# Patient Record
Sex: MALE
[De-identification: names, ages, dates, MRNs, and addresses within clinical notes are randomized per-mention and may not be internally consistent; named-entity substitution may affect disease eponyms.]

---

## 2020-02-25 ENCOUNTER — HOSPITAL ENCOUNTER (OUTPATIENT)
Dept: HOSPITAL 92 - CT | Age: 60
Discharge: HOME | End: 2020-02-25
Attending: FAMILY MEDICINE
Payer: COMMERCIAL

## 2020-02-25 DIAGNOSIS — R91.8: ICD-10-CM

## 2020-02-25 DIAGNOSIS — F17.210: ICD-10-CM

## 2020-02-25 DIAGNOSIS — Z12.2: Primary | ICD-10-CM

## 2020-02-25 PROCEDURE — G0297 LDCT FOR LUNG CA SCREEN: HCPCS

## 2020-02-25 NOTE — CT
CT CHEST WITHOUT IV CONTRAST:

Axial tomograms were obtained with multiplanar reconstruction.  A low-dose screening protocol was fol
lowed.

 

INDICATION: 

Nicotine dependence.  

 

COMPARISON: 

There are no prior chest films and no prior chest CTs available for comparison.

 

FINDINGS: 

There is an irregularly shaped mass in the left upper lobe which measures 3.0 cm AP dimension in the 
axial plane.  There is surrounding ground-glass opacity and air bronchograms within this mass.  

 

There is a second mass in the left infrahilar region measuring 2.7 cm AP dimension in the axial plane
.  

 

There is a large pleural-based mass in the left lower lobe posterolaterally measuring 6.2 cm AP dimen
silvia in the axial plane.  

 

Mild left pleural thickening.  No effusion.  

 

The right lung appears clear.  

 

There is mediastinal adenopathy.  AP window nodes are seen.  A paratracheal node measures up to 2 cm.
  There are abnormal carinal lymph nodes.  

 

Images through the upper abdomen are unremarkable.

 

Osseous structures are unremarkable.

 

IMPRESSION: 

1.  There are 3 mass lesions seen in the left lung.  The left upper lobe mass has air bronchograms an
d surrounding haziness which could represent secondary infectious/pneumonia associated with an underl
tello mass lesion.

 

2.  There is a circumscribed infrahilar mass on the left and there is a large pleural-based mass in t
he left lower lobe as described above.

 

Findings are category 4B, suspicious for malignancy.  CT chest with contrast is recommended for furth
er characterization.  Consider PET CT for further evaluation of apparent malignancy.  

 

 

CODE T

 

POS: GABBY

## 2020-03-17 ENCOUNTER — HOSPITAL ENCOUNTER (OUTPATIENT)
Dept: HOSPITAL 92 - PET | Age: 60
Discharge: HOME | End: 2020-03-17
Attending: FAMILY MEDICINE
Payer: COMMERCIAL

## 2020-03-17 DIAGNOSIS — R91.8: Primary | ICD-10-CM

## 2020-03-17 PROCEDURE — A9552 F18 FDG: HCPCS

## 2020-03-17 PROCEDURE — 78815 PET IMAGE W/CT SKULL-THIGH: CPT

## 2020-03-17 NOTE — PET
PET CT:

 

HISTORY: 

A 59-year-old male with lung mass on low-dose CT scan of the chest of 2/25/2020.

 

TECHNIQUE: 

PET scanning with CT attenuation correction was performed from the base of the brain through the prox
imal thighs following the intravenous administration of 13 mCi [id number] in the left ant
ecubital fossa.

 

COMPARISON: 

None.

 

CORRELATION:

LDCT of chest dated 2/25/2020.

 

FINDINGS: 

Multiple hypermetabolic lymph nodes are seen including the right supraclavicular (SUV 5.5), right pre
vascular (SUV 4.6), left prevascular (SUV 6.2), right paratracheal (SUV 7), aortopulmonic (SUV 6), navarro
bcarinal (SUV 9), left hilar (SUV 8), and left infrahilar (SUV 8) lymph nodes.

 

There are 3 hypermetabolic masses with SUVs of 4.3 in the upper lobe, 8.6 in the 2.7 cm mass in the l
eft lower lobe, and 10.5 in the 6.2 cm peripheral mass in the left lower lobe.

 

A hypermetabolic focus in the left lobe of the liver has an SUV of 5.8.

 

No hypermetabolic right lung nodules, right liver lobe, adrenal, or skeletal lesions are seen.

 

There is physiologic activity in the GI/ tracts, heart, and visualized portions of the brain.

 

The CT scan used for attenuation correction demonstrates no evidence of pleural effusions or ascites.


 

IMPRESSION: 

Findings are consistent with left lung malignancy and metastatic disease.

 

POS: GABBY

## 2020-03-26 ENCOUNTER — HOSPITAL ENCOUNTER (OUTPATIENT)
Dept: HOSPITAL 92 - CT | Age: 60
End: 2020-03-26
Attending: INTERNAL MEDICINE
Payer: COMMERCIAL

## 2020-03-26 VITALS — TEMPERATURE: 99 F | DIASTOLIC BLOOD PRESSURE: 75 MMHG | SYSTOLIC BLOOD PRESSURE: 126 MMHG

## 2020-03-26 VITALS — BODY MASS INDEX: 32.2 KG/M2

## 2020-03-26 DIAGNOSIS — Z87.891: ICD-10-CM

## 2020-03-26 DIAGNOSIS — Z79.82: ICD-10-CM

## 2020-03-26 DIAGNOSIS — Z79.899: ICD-10-CM

## 2020-03-26 DIAGNOSIS — I10: ICD-10-CM

## 2020-03-26 DIAGNOSIS — C34.32: Primary | ICD-10-CM

## 2020-03-26 LAB
APTT PPP: 29.6 SEC (ref 22.9–36.1)
INR PPP: 1
PROTHROMBIN TIME: 12.8 SEC (ref 12–14.7)

## 2020-03-26 PROCEDURE — 88341 IMHCHEM/IMCYTCHM EA ADD ANTB: CPT

## 2020-03-26 PROCEDURE — BB28ZZZ COMPUTERIZED TOMOGRAPHY (CT SCAN) OF LEFT TRACHEOBRONCHIAL TREE: ICD-10-PCS | Performed by: RADIOLOGY

## 2020-03-26 PROCEDURE — 85730 THROMBOPLASTIN TIME PARTIAL: CPT

## 2020-03-26 PROCEDURE — 71045 X-RAY EXAM CHEST 1 VIEW: CPT

## 2020-03-26 PROCEDURE — 77002 NEEDLE LOCALIZATION BY XRAY: CPT

## 2020-03-26 PROCEDURE — 36415 COLL VENOUS BLD VENIPUNCTURE: CPT

## 2020-03-26 PROCEDURE — 88333 PATH CONSLTJ SURG CYTO XM 1: CPT

## 2020-03-26 PROCEDURE — 88305 TISSUE EXAM BY PATHOLOGIST: CPT

## 2020-03-26 PROCEDURE — 32405: CPT

## 2020-03-26 PROCEDURE — 88334 PATH CONSLTJ SURG CYTO XM EA: CPT

## 2020-03-26 PROCEDURE — 0BBJ3ZX EXCISION OF LEFT LOWER LUNG LOBE, PERCUTANEOUS APPROACH, DIAGNOSTIC: ICD-10-PCS | Performed by: RADIOLOGY

## 2020-03-26 PROCEDURE — 85610 PROTHROMBIN TIME: CPT

## 2020-03-26 PROCEDURE — 88342 IMHCHEM/IMCYTCHM 1ST ANTB: CPT

## 2020-03-26 NOTE — RAD
EXAM:

XR Chest Insp/Exp



PROVIDED CLINICAL HISTORY:

Post biopsy left lower lobe mass.



COMPARISON:

Chest x-ray on 5/31/2017 and CT thorax on 2/25/2020



FINDINGS:

Left upper lobe parenchymal density which has masslike appearance as well as left lower lobe and left
 hilar masses are seen and better seen on CT thorax. The right lung is clear. Blunting of the left

lateral costophrenic angle is present, but this was seen on the  view of the chest and likely re
lated to pleural thickening as a left lower lobe mass abuts the posterolateral left costophrenic

angle. No pneumothorax or definite pleural effusion is present. There is prominence of the right para
mediastinal structures, but this was present on prior  view of the CT thorax and shown to

represent vascular structures and mildly enlarged lymph nodes. Cardiac silhouette and pulmonary vascu
lature are within normal limits. Vascular calcifications are seen in the aortic arch. Osseous

structures have a normal appearance.



IMPRESSION:



1. Masses left lung better seen on prior CT thorax.

2. No evidence of a pneumothorax.



Reported By: Sagar Fuentes 

Electronically Signed:  3/26/2020 11:10 AM

## 2020-03-26 NOTE — RAD
Chest 2 views inspiratory expiratory



HISTORY: Lung mass with biopsy.



FINDINGS: Correlated with earlier images on the same date.



Left lung is well-inflated. Masses are similar in appearance to the prior study.



Mediastinum is midline. Right lung clear.



  



IMPRESSION :

No evidence of postbiopsy pneumothorax.



Reported By: BHARATI Neumann 

Electronically Signed:  3/26/2020 1:00 PM

## 2020-03-26 NOTE — CT
PROCEDURE:

CT-guided percutaneous biopsy left lower lobe mass



PROVIDED CLINICAL HISTORY:

59-year-old male patient with nicotine dependence and recent imaging demonstrating 3 masses in the le
ft lung largest which is pleural-based in the left lower lobe. Biopsy was requested.



COMPARISON:

PET/CT exam on 3/17/2020



TECHNIQUE:

The procedure including the risks and complications were explained to the patient, and informed conse
nt was obtained. The patient was placed on the CT scan table in the prone position. Grid localizer

was placed overlying the posterolateral right lower chest. Limited noncontrasted CT scan was obtained
. An area was marked and then meticulously prepped and draped in the usual sterile fashion.



The skin and subcutaneous tissues were infiltrated with buffered 1% lidocaine for local anesthesia at
 the intended puncture site. A small skin incision was made. A 17-gauge guide needle was advanced

followed by axial noncontrasted CT images. This was repeated until the needle was positioned within t
he peripheral aspect of the right lower lobe mass. A total of four 18-gauge core needle biopsy

specimens were obtained utilizing coaxial technique. Pathologist was available for evaluation of the 
specimens, and adequate tissue was obtained. No additional biopsy specimens were requested.



A follow-up noncontrasted CT scan was obtained which demonstrated no evidence of a pneumothorax or pl
eural fluid or findings to suggest hematoma at site of biopsy. The needle was removed, and

hemostasis was achieved with direct pressure. The patient tolerated the procedure well and without im
mediate complication. Patient was transported to radiology nurses holding area for further

monitoring prior to discharge. Follow-up chest x-rays are scheduled.



IMPRESSION:

Technically successful CT-guided percutaneous biopsy of a large pleural-based left lower lobe mass.



Reported By: Sagar Fuentes 

Electronically Signed:  3/26/2020 11:03 AM

## 2020-03-28 ENCOUNTER — HOSPITAL ENCOUNTER (EMERGENCY)
Dept: HOSPITAL 92 - ERS | Age: 60
End: 2020-03-28
Payer: COMMERCIAL

## 2020-03-28 DIAGNOSIS — I46.9: Primary | ICD-10-CM

## 2020-03-28 PROCEDURE — 99285 EMERGENCY DEPT VISIT HI MDM: CPT
